# Patient Record
Sex: MALE | Race: WHITE | NOT HISPANIC OR LATINO | Employment: FULL TIME | URBAN - METROPOLITAN AREA
[De-identification: names, ages, dates, MRNs, and addresses within clinical notes are randomized per-mention and may not be internally consistent; named-entity substitution may affect disease eponyms.]

---

## 2024-08-05 ENCOUNTER — TELEPHONE (OUTPATIENT)
Dept: PRIMARY CARE | Facility: CLINIC | Age: 54
End: 2024-08-05
Payer: COMMERCIAL

## 2024-08-05 NOTE — TELEPHONE ENCOUNTER
Found tick on his scalp and he believe it was in his scalp.  It is still alive.  Does he need to do anything?  Please call cell phone [phone]

## 2024-10-07 ENCOUNTER — TELEPHONE (OUTPATIENT)
Dept: PRIMARY CARE | Facility: CLINIC | Age: 54
End: 2024-10-07
Payer: COMMERCIAL

## 2024-10-07 DIAGNOSIS — Z00.00 WELLNESS EXAMINATION: ICD-10-CM

## 2024-10-07 DIAGNOSIS — Z12.5 PROSTATE CANCER SCREENING: Primary | ICD-10-CM

## 2024-10-07 NOTE — TELEPHONE ENCOUNTER
Patient is scheduled for yearly appointment on 10/22.  He is asking if you could please put an order in the system for his blood work.

## 2024-10-15 ENCOUNTER — LAB (OUTPATIENT)
Dept: LAB | Facility: LAB | Age: 54
End: 2024-10-15
Payer: COMMERCIAL

## 2024-10-15 DIAGNOSIS — Z00.00 WELLNESS EXAMINATION: ICD-10-CM

## 2024-10-15 DIAGNOSIS — Z12.5 PROSTATE CANCER SCREENING: ICD-10-CM

## 2024-10-15 LAB
ALBUMIN SERPL BCP-MCNC: 4.1 G/DL (ref 3.4–5)
ALP SERPL-CCNC: 46 U/L (ref 33–120)
ALT SERPL W P-5'-P-CCNC: 24 U/L (ref 10–52)
ANION GAP SERPL CALC-SCNC: 13 MMOL/L (ref 10–20)
AST SERPL W P-5'-P-CCNC: 19 U/L (ref 9–39)
BILIRUB SERPL-MCNC: 0.5 MG/DL (ref 0–1.2)
BUN SERPL-MCNC: 17 MG/DL (ref 6–23)
CALCIUM SERPL-MCNC: 8.9 MG/DL (ref 8.6–10.3)
CHLORIDE SERPL-SCNC: 102 MMOL/L (ref 98–107)
CHOLEST SERPL-MCNC: 244 MG/DL (ref 0–199)
CHOLESTEROL/HDL RATIO: 5.1
CO2 SERPL-SCNC: 27 MMOL/L (ref 21–32)
CREAT SERPL-MCNC: 0.75 MG/DL (ref 0.5–1.3)
EGFRCR SERPLBLD CKD-EPI 2021: >90 ML/MIN/1.73M*2
ERYTHROCYTE [DISTWIDTH] IN BLOOD BY AUTOMATED COUNT: 12.5 % (ref 11.5–14.5)
EST. AVERAGE GLUCOSE BLD GHB EST-MCNC: 105 MG/DL
GLUCOSE SERPL-MCNC: 99 MG/DL (ref 74–99)
HBA1C MFR BLD: 5.3 %
HCT VFR BLD AUTO: 43.4 % (ref 41–52)
HDLC SERPL-MCNC: 47.4 MG/DL
HGB BLD-MCNC: 14.4 G/DL (ref 13.5–17.5)
LDLC SERPL CALC-MCNC: 146 MG/DL
MCH RBC QN AUTO: 32.9 PG (ref 26–34)
MCHC RBC AUTO-ENTMCNC: 33.2 G/DL (ref 32–36)
MCV RBC AUTO: 99 FL (ref 80–100)
NON HDL CHOLESTEROL: 197 MG/DL (ref 0–149)
NRBC BLD-RTO: 0 /100 WBCS (ref 0–0)
PLATELET # BLD AUTO: 238 X10*3/UL (ref 150–450)
POTASSIUM SERPL-SCNC: 4 MMOL/L (ref 3.5–5.3)
PROT SERPL-MCNC: 6.5 G/DL (ref 6.4–8.2)
PSA SERPL-MCNC: 0.52 NG/ML
RBC # BLD AUTO: 4.38 X10*6/UL (ref 4.5–5.9)
SODIUM SERPL-SCNC: 138 MMOL/L (ref 136–145)
TRIGL SERPL-MCNC: 253 MG/DL (ref 0–149)
TSH SERPL-ACNC: 2.28 MIU/L (ref 0.44–3.98)
VLDL: 51 MG/DL (ref 0–40)
WBC # BLD AUTO: 7.1 X10*3/UL (ref 4.4–11.3)

## 2024-10-15 PROCEDURE — 84443 ASSAY THYROID STIM HORMONE: CPT

## 2024-10-15 PROCEDURE — 36415 COLL VENOUS BLD VENIPUNCTURE: CPT

## 2024-10-15 PROCEDURE — 85027 COMPLETE CBC AUTOMATED: CPT

## 2024-10-15 PROCEDURE — 83036 HEMOGLOBIN GLYCOSYLATED A1C: CPT

## 2024-10-15 PROCEDURE — 80053 COMPREHEN METABOLIC PANEL: CPT

## 2024-10-15 PROCEDURE — 84153 ASSAY OF PSA TOTAL: CPT

## 2024-10-15 PROCEDURE — 80061 LIPID PANEL: CPT

## 2024-10-22 ENCOUNTER — APPOINTMENT (OUTPATIENT)
Dept: PRIMARY CARE | Facility: CLINIC | Age: 54
End: 2024-10-22
Payer: COMMERCIAL

## 2024-10-22 VITALS
TEMPERATURE: 96.9 F | OXYGEN SATURATION: 99 % | BODY MASS INDEX: 31.37 KG/M2 | DIASTOLIC BLOOD PRESSURE: 76 MMHG | HEIGHT: 68 IN | WEIGHT: 207 LBS | SYSTOLIC BLOOD PRESSURE: 118 MMHG | HEART RATE: 74 BPM

## 2024-10-22 DIAGNOSIS — Z23 NEED FOR SHINGLES VACCINE: ICD-10-CM

## 2024-10-22 DIAGNOSIS — Z23 NEED FOR TDAP VACCINATION: ICD-10-CM

## 2024-10-22 DIAGNOSIS — Z00.00 ANNUAL PHYSICAL EXAM: Primary | ICD-10-CM

## 2024-10-22 DIAGNOSIS — E78.5 HYPERLIPIDEMIA, UNSPECIFIED HYPERLIPIDEMIA TYPE: ICD-10-CM

## 2024-10-22 PROCEDURE — 3008F BODY MASS INDEX DOCD: CPT | Performed by: FAMILY MEDICINE

## 2024-10-22 PROCEDURE — 99396 PREV VISIT EST AGE 40-64: CPT | Performed by: FAMILY MEDICINE

## 2024-10-22 RX ORDER — TADALAFIL 10 MG/1
10 TABLET ORAL DAILY PRN
COMMUNITY

## 2024-10-22 RX ORDER — ROSUVASTATIN CALCIUM 10 MG/1
10 TABLET, COATED ORAL DAILY
Qty: 30 TABLET | Refills: 2 | Status: SHIPPED | OUTPATIENT
Start: 2024-10-22 | End: 2025-01-20

## 2024-10-22 RX ORDER — SILDENAFIL 100 MG/1
100 TABLET, FILM COATED ORAL AS NEEDED
COMMUNITY

## 2024-10-22 ASSESSMENT — PATIENT HEALTH QUESTIONNAIRE - PHQ9
SUM OF ALL RESPONSES TO PHQ9 QUESTIONS 1 AND 2: 0
1. LITTLE INTEREST OR PLEASURE IN DOING THINGS: NOT AT ALL
2. FEELING DOWN, DEPRESSED OR HOPELESS: NOT AT ALL

## 2024-10-22 NOTE — LETTER
October 22, 2024     Patient: Desmond Chávez   YOB: 1970   Date of Visit: 10/22/2024       To Whom It May Concern:    Desmond Chávez was seen in my clinic on 10/22/2024 at 2:00 pm. Please excuse Desmond for his absence from work on this day to make the appointment.    If you have any questions or concerns, please don't hesitate to call.         Sincerely,         Amanuel Valera DO        CC: No Recipients

## 2024-10-22 NOTE — PROGRESS NOTES
Subjective   Patient ID: Desmond Chávez is a 54 y.o. male who presents for Annual Exam.         Reviewed all medications by prescribing practitioner or clinical pharmacist (such as prescriptions, OTCs, herbal therapies and supplements) and documented in the medical record.    HPI  1.  Physical exam.  Colonoscopy: last done 12/13/2022, five year clearance   Immunizations: Tdap, shingrix, influenza due  States he has been well as of late. Endorses moderate level of exercise, tries to maintain healthy diet. Without complaint.    2. Erectile Dysfunction   Cialis 10 mg every day PRN  Viagra 100 mg PRN  Denies LUTS  Tolerating medication well, without significant adverse side effect.     3. Hyperlipidemia   , Cholesterol 244, Triglycerides 244 as of 10/15/2024  Denies dizziness, weakness, confusion, headache, vision/hearing changes, numbness/tingling, cough, shortness of breath, chest pain, palpitations, abdominal pain, postprandial pain, bowel/bladder changes, calf pain, claudication, lower extremity edema.   States that has tried diet and exercise in the past. Furthermore, he stated that he would like a medication to lower his cholesterol.    Review of Systems  All pertinent positive symptoms are included in the history of present illness.  All other systems have been reviewed and are negative and noncontributory to this patient's current ailments.    No past medical history on file.  No past surgical history on file.  Social History     Tobacco Use    Smoking status: Former     Types: Cigarettes    Smokeless tobacco: Never     No family history on file.  Immunization History   Administered Date(s) Administered    Moderna SARS-CoV-2 Vaccination 03/17/2021, 04/14/2021     Current Outpatient Medications   Medication Instructions    sildenafil (VIAGRA) 100 mg, As needed    tadalafil (CIALIS) 10 mg, Daily PRN     No Known Allergies    Objective   Vitals:    10/22/24 1357   BP: 118/76   Pulse: 74   Temp: 36.1 °C  "(96.9 °F)   SpO2: 99%   Weight: 93.9 kg (207 lb)   Height: 1.727 m (5' 8\")     Body mass index is 31.47 kg/m².    BP Readings from Last 3 Encounters:   10/22/24 118/76   06/16/21 117/72   06/09/21 130/74      Wt Readings from Last 3 Encounters:   10/22/24 93.9 kg (207 lb)   06/16/21 92.4 kg (203 lb 9.6 oz)   06/09/21 92.3 kg (203 lb 6.4 oz)      Lab on 10/15/2024   Component Date Value    Prostate Specific Antige* 10/15/2024 0.52     Hemoglobin A1C 10/15/2024 5.3     Estimated Average Glucose 10/15/2024 105     Thyroid Stimulating Horm* 10/15/2024 2.28     Cholesterol 10/15/2024 244 (H)     HDL-Cholesterol 10/15/2024 47.4     Cholesterol/HDL Ratio 10/15/2024 5.1     LDL Calculated 10/15/2024 146 (H)     VLDL 10/15/2024 51 (H)     Triglycerides 10/15/2024 253 (H)     Non HDL Cholesterol 10/15/2024 197 (H)     Glucose 10/15/2024 99     Sodium 10/15/2024 138     Potassium 10/15/2024 4.0     Chloride 10/15/2024 102     Bicarbonate 10/15/2024 27     Anion Gap 10/15/2024 13     Urea Nitrogen 10/15/2024 17     Creatinine 10/15/2024 0.75     eGFR 10/15/2024 >90     Calcium 10/15/2024 8.9     Albumin 10/15/2024 4.1     Alkaline Phosphatase 10/15/2024 46     Total Protein 10/15/2024 6.5     AST 10/15/2024 19     Bilirubin, Total 10/15/2024 0.5     ALT 10/15/2024 24     WBC 10/15/2024 7.1     nRBC 10/15/2024 0.0     RBC 10/15/2024 4.38 (L)     Hemoglobin 10/15/2024 14.4     Hematocrit 10/15/2024 43.4     MCV 10/15/2024 99     MCH 10/15/2024 32.9     MCHC 10/15/2024 33.2     RDW 10/15/2024 12.5     Platelets 10/15/2024 238      Physical Exam  CONSTITUTIONAL - well nourished, well developed, looks like stated age, in no acute distress, not ill-appearing, and not tired appearing  SKIN - normal skin color and pigmentation, normal skin turgor without rash, lesions, or nodules visualized  HEAD - no trauma, normocephalic  EYES - pupils are equal and reactive to light, extraocular muscles are intact, and normal external exam  ENT - " TM's intact, no injection, no signs of infection, uvula midline, normal tongue movement and throat normal, no exudate, nasal passage without discharge and patent  NECK - supple without rigidity, no neck mass was observed, no thyromegaly or thyroid nodules  CHEST - clear to auscultation, no wheezing, no crackles and no rales, good effort  CARDIAC - regular rate and regular rhythm, no skipped beats, no murmur  ABDOMEN - no organomegaly, soft, nontender, nondistended, normal bowel sounds, no guarding/rebound/rigidity  EXTREMITIES - no obvious or evident edema, no obvious or evident deformities  NEUROLOGICAL - normal gait, normal balance, normal motor, no ataxia, DTRs equal and symmetrical; alert, oriented and no focal signs  PSYCHIATRIC - alert, pleasant and cordial, age-appropriate  IMMUNOLOGIC - no cervical lymphadenopathy    Assessment/Plan   Annual Exam  Complete history and physical examination were performed   CBC, CMP, Lipid, A1c, PSA, TSH w/ reflex to free T4 completed  CT Cardiac Calcium Score ordered, please schedule and complete at your convenience   Tdap and Shingrix vaccines administered in office today 10/22/2024  Erectile Dysfunction   Stable, no changes to medical management recommended at this time    3. Hyperlipidemia   We are going to start you on Crestor 10 mg by mouth every day for three months.   Follow up with the office in three months for a Lipid panel and we will evaluate if changes to the medication are needed.   Please reach out to the office if you are having any adverse reactions/side effects to the new medicine.       Yoseph Flowers [facility]  [facility]    I have reviewed with medical students note including the assessment and plan. I agree with stated above. I placed the orders for this visit and will follow up with the results.    Patient was staffed with Stephania Shi DO, PGY-3  [facility]

## 2024-10-23 PROCEDURE — 90715 TDAP VACCINE 7 YRS/> IM: CPT | Performed by: FAMILY MEDICINE

## 2024-10-23 PROCEDURE — 90472 IMMUNIZATION ADMIN EACH ADD: CPT | Performed by: FAMILY MEDICINE

## 2024-10-23 PROCEDURE — 90471 IMMUNIZATION ADMIN: CPT | Performed by: FAMILY MEDICINE

## 2024-10-23 PROCEDURE — 90750 HZV VACC RECOMBINANT IM: CPT | Performed by: FAMILY MEDICINE

## 2024-10-23 NOTE — PROGRESS NOTES
I reviewed and examined the patient. I was present for the key exam elements, and I fully participated in the patient's care. I discussed the management of the care with the resident. I have personally reviewed the pertinent labs and imaging, as well as recent notes, with the patient. I have reviewed the note above and agree with the resident's medical decision making as documented in the resident's note, in addition to the following comments / findings:     Agree with the rest of the plan outlined below by resident physician. No red flags.      The patient understands and agrees to the assessment and plan of care. Patient has also agreed to follow up and comply with the treatment and evaluation as recommended today. Patient was instructed to call the office at [phone] should questions arise regarding their treatment or care.     Amanuel Valera DO, FAOASM  Family Medicine   [address], [address]  [place] [address]     Amanuel Valera DO

## 2024-12-27 ENCOUNTER — HOSPITAL ENCOUNTER (OUTPATIENT)
Dept: RADIOLOGY | Facility: HOSPITAL | Age: 54
Discharge: HOME | End: 2024-12-27
Payer: COMMERCIAL

## 2024-12-27 DIAGNOSIS — E78.5 HYPERLIPIDEMIA, UNSPECIFIED HYPERLIPIDEMIA TYPE: ICD-10-CM

## 2024-12-27 PROCEDURE — 75571 CT HRT W/O DYE W/CA TEST: CPT

## 2025-01-06 ENCOUNTER — TELEPHONE (OUTPATIENT)
Dept: PRIMARY CARE | Facility: CLINIC | Age: 55
End: 2025-01-06
Payer: COMMERCIAL

## 2025-01-06 DIAGNOSIS — E78.5 HYPERLIPIDEMIA, UNSPECIFIED HYPERLIPIDEMIA TYPE: Primary | ICD-10-CM

## 2025-01-14 ENCOUNTER — LAB (OUTPATIENT)
Dept: LAB | Facility: LAB | Age: 55
End: 2025-01-14
Payer: COMMERCIAL

## 2025-01-14 DIAGNOSIS — E78.5 HYPERLIPIDEMIA, UNSPECIFIED HYPERLIPIDEMIA TYPE: ICD-10-CM

## 2025-01-14 LAB
CHOLEST SERPL-MCNC: 159 MG/DL (ref 0–199)
CHOLESTEROL/HDL RATIO: 3
HDLC SERPL-MCNC: 53.8 MG/DL
LDLC SERPL CALC-MCNC: 80 MG/DL
NON HDL CHOLESTEROL: 105 MG/DL (ref 0–149)
TRIGL SERPL-MCNC: 127 MG/DL (ref 0–149)
VLDL: 25 MG/DL (ref 0–40)

## 2025-01-14 PROCEDURE — 80061 LIPID PANEL: CPT

## 2025-01-15 DIAGNOSIS — E78.5 HYPERLIPIDEMIA, UNSPECIFIED HYPERLIPIDEMIA TYPE: ICD-10-CM

## 2025-01-17 RX ORDER — ROSUVASTATIN CALCIUM 10 MG/1
10 TABLET, COATED ORAL DAILY
Qty: 30 TABLET | Refills: 2 | Status: SHIPPED | OUTPATIENT
Start: 2025-01-17 | End: 2025-04-17

## 2025-01-21 NOTE — PROGRESS NOTES
Subjective   Patient ID: Desmond Chávez is a 54 y.o. male who presents for No chief complaint on file..    Past Medical, Surgical, and Family History reviewed and updated in chart.    Reviewed all medications by prescribing practitioner or clinical pharmacist (such as prescriptions, OTCs, herbal therapies and supplements) and documented in the medical record.    HPI  2. Erectile Dysfunction   Cialis 10 mg as needed but does not work as well.   Viagra 100 mg which works better than Cialis.  Tolerating medication well, without significant adverse side effect.   [place] the [place] but is not interested in a prescription     3. Hyperlipidemia   Last lipid panel was completed one week prior and was remarkable for a cholesterol of 159 and LDL 80 currently on Crestor 10 mg daily. He denies any new chest pain or shortness of breath.      Review of Systems  All pertinent positive symptoms are included in the history of present illness.    All other systems have been reviewed and are negative and noncontributory to this patient's current ailments.    History reviewed. No pertinent past medical history.  History reviewed. No pertinent surgical history.  Social History     Tobacco Use    Smoking status: Former     Types: Cigarettes    Smokeless tobacco: Never     No family history on file.  Immunization History   Administered Date(s) Administered    COVID-19, mRNA, LNP-S, PF, 30 mcg/0.3 mL dose 12/15/2021    Moderna SARS-CoV-2 Vaccination 03/17/2021, 04/14/2021    Tdap vaccine, age 7 year and older (BOOSTRIX, ADACEL) 10/23/2024    Zoster vaccine, recombinant, adult (SHINGRIX) 10/23/2024     Current Outpatient Medications   Medication Instructions    rosuvastatin (CRESTOR) 10 mg, oral, Daily    sildenafil (VIAGRA) 100 mg, As needed    tadalafil (CIALIS) 10 mg, Daily PRN     No Known Allergies    Objective   Vitals:    01/22/25 0948   BP: 112/72   Pulse: 90   SpO2: 96%   Weight: 93.4 kg  "(206 lb)   Height: 1.753 m (5' 9\")     Body mass index is 30.42 kg/m².    BP Readings from Last 3 Encounters:   01/22/25 112/72   10/22/24 118/76   06/16/21 117/72      Wt Readings from Last 3 Encounters:   01/22/25 93.4 kg (206 lb)   10/22/24 93.9 kg (207 lb)   06/16/21 92.4 kg (203 lb 9.6 oz)        Lab on 01/14/2025   Component Date Value    Cholesterol 01/14/2025 159     HDL-Cholesterol 01/14/2025 53.8     Cholesterol/HDL Ratio 01/14/2025 3.0     LDL Calculated 01/14/2025 80     VLDL 01/14/2025 25     Triglycerides 01/14/2025 127     Non HDL Cholesterol 01/14/2025 105      Physical Exam  CONSTITUTIONAL - well nourished, well developed, looks like stated age, in no acute distress, not ill-appearing, and not tired appearing  SKIN - normal skin color and pigmentation, normal skin turgor without rash, lesions, or nodules visualized  HEAD - no trauma, normocephalic  EYES - pupils are equal and reactive to light, extraocular muscles are intact, and normal external exam  CHEST - clear to auscultation, no wheezing, no crackles and no rales, good effort  CARDIAC - regular rate and regular rhythm, no skipped beats, no murmur  EXTREMITIES - no obvious or evident edema, no obvious or evident deformities  NEUROLOGICAL - alert, oriented and no focal signs  PSYCHIATRIC - alert, pleasant and cordial, age-appropriate    Assessment/Plan   Problem List Items Addressed This Visit    None    Hyperlipidemia   Refilled crestor for 180 + 1 refill    2. Erectile dysfunction   Sent Rx for Viagra 100 mg and Cialis 10 mg for 90 days  I advised pt that meds can affect blood pressure and if he begins to experience dizziness, to reduce the dose or discontinue the medication.    Follow-up in October for jeff Phillips DO  PGY-2  Family Medicine       "

## 2025-01-22 ENCOUNTER — APPOINTMENT (OUTPATIENT)
Dept: PRIMARY CARE | Facility: CLINIC | Age: 55
End: 2025-01-22
Payer: COMMERCIAL

## 2025-01-22 VITALS
DIASTOLIC BLOOD PRESSURE: 72 MMHG | OXYGEN SATURATION: 96 % | HEART RATE: 90 BPM | WEIGHT: 206 LBS | BODY MASS INDEX: 30.51 KG/M2 | HEIGHT: 69 IN | SYSTOLIC BLOOD PRESSURE: 112 MMHG

## 2025-01-22 DIAGNOSIS — E78.5 HYPERLIPIDEMIA, UNSPECIFIED HYPERLIPIDEMIA TYPE: Primary | ICD-10-CM

## 2025-01-22 DIAGNOSIS — N52.9 ERECTILE DISORDER: ICD-10-CM

## 2025-01-22 PROCEDURE — 3008F BODY MASS INDEX DOCD: CPT

## 2025-01-22 PROCEDURE — 99214 OFFICE O/P EST MOD 30 MIN: CPT

## 2025-01-22 PROCEDURE — 1036F TOBACCO NON-USER: CPT

## 2025-01-22 RX ORDER — SILDENAFIL 100 MG/1
100 TABLET, FILM COATED ORAL DAILY PRN
Qty: 90 TABLET | Refills: 0 | Status: SHIPPED | OUTPATIENT
Start: 2025-01-22

## 2025-01-22 RX ORDER — ROSUVASTATIN CALCIUM 10 MG/1
10 TABLET, COATED ORAL DAILY
Qty: 180 TABLET | Refills: 1 | Status: SHIPPED | OUTPATIENT
Start: 2025-01-22

## 2025-01-22 RX ORDER — TADALAFIL 20 MG/1
20 TABLET ORAL DAILY PRN
Qty: 90 TABLET | Refills: 0 | Status: SHIPPED | OUTPATIENT
Start: 2025-01-22

## 2025-01-22 ASSESSMENT — PATIENT HEALTH QUESTIONNAIRE - PHQ9
2. FEELING DOWN, DEPRESSED OR HOPELESS: NOT AT ALL
SUM OF ALL RESPONSES TO PHQ9 QUESTIONS 1 AND 2: 0
1. LITTLE INTEREST OR PLEASURE IN DOING THINGS: NOT AT ALL

## 2025-01-22 NOTE — PROGRESS NOTES
I reviewed and examined the patient. I was present for the key exam elements, and I fully participated in the patient's care. I discussed the management of the care with the resident. I have personally reviewed the pertinent labs and imaging, as well as recent notes, with the patient. I have reviewed the note above and agree with the resident's medical decision making as documented in the resident's note, in addition to the following comments / findings:     Agree with the rest of the plan outlined below by resident physician. No red flags.      The patient understands and agrees to the assessment and plan of care. Patient has also agreed to follow up and comply with the treatment and evaluation as recommended today. Patient was instructed to call the office at [phone] should questions arise regarding their treatment or care.     Amanuel Valera DO, FAOASM  Family Medicine   [address], [address]  [place] [address]     Amanuel Valera DO

## 2025-10-22 ENCOUNTER — APPOINTMENT (OUTPATIENT)
Dept: PRIMARY CARE | Facility: CLINIC | Age: 55
End: 2025-10-22
Payer: COMMERCIAL

## 2025-10-23 ENCOUNTER — APPOINTMENT (OUTPATIENT)
Facility: CLINIC | Age: 55
End: 2025-10-23
Payer: COMMERCIAL